# Patient Record
Sex: FEMALE | Race: WHITE | Employment: FULL TIME | URBAN - METROPOLITAN AREA
[De-identification: names, ages, dates, MRNs, and addresses within clinical notes are randomized per-mention and may not be internally consistent; named-entity substitution may affect disease eponyms.]

---

## 2020-07-10 LAB
ABO + RH BLD: NORMAL
ABO + RH BLD: NORMAL
BLD GP AB SCN SERPL QL: NEGATIVE
RUBELLA ABY IGG: NORMAL
TREPONEMA ANTIBODIES: NORMAL

## 2020-12-28 LAB — GROUP B STREP PCR: NEGATIVE

## 2021-01-05 DIAGNOSIS — Z11.59 ENCOUNTER FOR SCREENING FOR OTHER VIRAL DISEASES: Primary | ICD-10-CM

## 2021-01-16 ENCOUNTER — HOSPITAL ENCOUNTER (OUTPATIENT)
Dept: LAB | Facility: CLINIC | Age: 41
Discharge: HOME | End: 2021-01-16
Admitting: STUDENT IN AN ORGANIZED HEALTH CARE EDUCATION/TRAINING PROGRAM
Payer: COMMERCIAL

## 2021-01-16 DIAGNOSIS — Z11.59 ENCOUNTER FOR SCREENING FOR OTHER VIRAL DISEASES: ICD-10-CM

## 2021-01-16 LAB
SARS-COV-2 RNA RESP QL NAA+PROBE: NORMAL
SPECIMEN SOURCE: NORMAL

## 2021-01-16 PROCEDURE — U0005 INFEC AGEN DETEC AMPLI PROBE: HCPCS | Performed by: STUDENT IN AN ORGANIZED HEALTH CARE EDUCATION/TRAINING PROGRAM

## 2021-01-16 PROCEDURE — U0003 INFECTIOUS AGENT DETECTION BY NUCLEIC ACID (DNA OR RNA); SEVERE ACUTE RESPIRATORY SYNDROME CORONAVIRUS 2 (SARS-COV-2) (CORONAVIRUS DISEASE [COVID-19]), AMPLIFIED PROBE TECHNIQUE, MAKING USE OF HIGH THROUGHPUT TECHNOLOGIES AS DESCRIBED BY CMS-2020-01-R: HCPCS | Performed by: STUDENT IN AN ORGANIZED HEALTH CARE EDUCATION/TRAINING PROGRAM

## 2021-01-17 LAB
LABORATORY COMMENT REPORT: NORMAL
SARS-COV-2 RNA RESP QL NAA+PROBE: NEGATIVE
SPECIMEN SOURCE: NORMAL

## 2021-01-19 ENCOUNTER — OFFICE VISIT (OUTPATIENT)
Dept: URGENT CARE | Facility: URGENT CARE | Age: 41
End: 2021-01-19
Payer: COMMERCIAL

## 2021-01-19 DIAGNOSIS — Z20.822 ENCOUNTER FOR LABORATORY TESTING FOR COVID-19 VIRUS: Primary | ICD-10-CM

## 2021-01-19 DIAGNOSIS — Z11.59 SPECIAL SCREENING EXAMINATION FOR VIRAL DISEASE: Primary | ICD-10-CM

## 2021-01-19 DIAGNOSIS — Z11.59 SPECIAL SCREENING EXAMINATION FOR VIRAL DISEASE: ICD-10-CM

## 2021-01-19 LAB
SARS-COV-2 RNA RESP QL NAA+PROBE: NORMAL
SPECIMEN SOURCE: NORMAL

## 2021-01-19 PROCEDURE — 87635 SARS-COV-2 COVID-19 AMP PRB: CPT | Performed by: STUDENT IN AN ORGANIZED HEALTH CARE EDUCATION/TRAINING PROGRAM

## 2021-01-19 NOTE — H&P
Emilie Sandra [facility]  [id number]      OB Admit History & Physical      Pt is a 39 yo  at 39w1d by LMP c/w 1TUS is here for primary  section due to breech presentation. She denies LOF, VB, or contractions. Reports + FM.     Prenatal care: Rolando Harris   Pregnancy complications:   1. AMA, normal cfDNA  2. Level II US with echogenic focus, s/p MFM consult   3. LGA, 36 wk US 84% previously 97%   4. Obesity   5. H/o of HSV on suppression     PNL: O+/Ab-/RI/RPR NR/HBS-/HIV-/GCCT-/GBBS -     Her OB history:      2x EAB s/p 2x D&C  2x MAB     PMHx:   HSV positive, anxiety, anemia, obesity     PMHx:  D&C x 2       Allergies:  NKDA    REVIEW OF SYSTEMS:  NEUROLOGIC:  Negative  EYES:  Negative  ENT:  Negative  GI:  Negative  BREAST:  Negative  :  Negative  GYN:  Negative  CV:  Negative  PULMONARY:  Negative  MUSCULOSKELETAL:  Negative  PSYCH:  Negative        Social History     Socioeconomic History     Marital status:      Spouse name: Not on file     Number of children: Not on file     Years of education: Not on file     Highest education level: Not on file   Occupational History     Not on file   Social Needs     Financial resource strain: Not on file     Food insecurity     Worry: Not on file     Inability: Not on file     Transportation needs     Medical: Not on file     Non-medical: Not on file   Tobacco Use     Smoking status: Not on file   Substance and Sexual Activity     Alcohol use: Not on file     Drug use: Not on file     Sexual activity: Not on file   Lifestyle     Physical activity     Days per week: Not on file     Minutes per session: Not on file     Stress: Not on file   Relationships     Social connections     Talks on phone: Not on file     Gets together: Not on file     Attends Sabianism service: Not on file     Active member of club or organization: Not on file     Attends meetings of clubs or organizations: Not on file     Relationship status: Not on file      Intimate partner violence     Fear of current or ex partner: Not on file     Emotionally abused: Not on file     Physically abused: Not on file     Forced sexual activity: Not on file   Other Topics Concern     Not on file   Social History Narrative     Not on file      No family history on file.    Alert Awake in NAD  HEENT grossly normal  Lungs CTAB  Heart RRR  ABD gravid  EXT:  1+ edema or calf tenderness    Assessment: Pt is a  at 39w1d who presents for  section due to breech presentation.     Plan:   section     - Due to malpresentation and patient declined ECV will proceed with CS. The risks of  section have been reviewed including infection, bleeding and damage to a nearby organ such as the bladder or intestines.  These are rare events.  Antibiotitcs are given to decrease risk of infection. There is an increased risk of adhesions which can increase the risk for complications with repeated  section.  There is also an increased risk of placenta previa and accreta with increasing number of 's.  Medical risks such as VTE were also reviewed.  All her questions were answered and she wishes to proceed.   - Antibiotics, ancef 2 grams   - VTE prophylaxis, SCDs  - Labs, CBC, T&S, and RPR to be drawn       Anayeli Alonzo MD MD  Dept of OB/GYN  2021

## 2021-01-21 ENCOUNTER — ANESTHESIA EVENT (OUTPATIENT)
Dept: OBGYN | Facility: CLINIC | Age: 41
End: 2021-01-21
Payer: COMMERCIAL

## 2021-01-21 ENCOUNTER — HOSPITAL ENCOUNTER (INPATIENT)
Facility: CLINIC | Age: 41
LOS: 3 days | Discharge: HOME | End: 2021-01-24
Attending: STUDENT IN AN ORGANIZED HEALTH CARE EDUCATION/TRAINING PROGRAM | Admitting: STUDENT IN AN ORGANIZED HEALTH CARE EDUCATION/TRAINING PROGRAM
Payer: COMMERCIAL

## 2021-01-21 ENCOUNTER — ANESTHESIA (OUTPATIENT)
Dept: OBGYN | Facility: CLINIC | Age: 41
End: 2021-01-21
Payer: COMMERCIAL

## 2021-01-21 DIAGNOSIS — Z98.891 H/O CESAREAN SECTION: Primary | ICD-10-CM

## 2021-01-21 LAB
ABO + RH BLD: NORMAL
ABO + RH BLD: NORMAL
BLD GP AB SCN SERPL QL: NORMAL
BLOOD BANK CMNT PATIENT-IMP: NORMAL
HBV SURFACE AG SERPL QL IA: NONREACTIVE
HGB BLD-MCNC: 11.1 G/DL (ref 11.7–15.7)
PLATELET # BLD AUTO: 159 10E9/L (ref 150–450)
SPECIMEN EXP DATE BLD: NORMAL
T PALLIDUM AB SER QL: NONREACTIVE

## 2021-01-21 PROCEDURE — 370N000017 HC ANESTHESIA TECHNICAL FEE, PER MIN: Performed by: STUDENT IN AN ORGANIZED HEALTH CARE EDUCATION/TRAINING PROGRAM

## 2021-01-21 PROCEDURE — 258N000003 HC RX IP 258 OP 636: Performed by: NURSE ANESTHETIST, CERTIFIED REGISTERED

## 2021-01-21 PROCEDURE — 360N000076 HC SURGERY LEVEL 3, PER MIN: Performed by: STUDENT IN AN ORGANIZED HEALTH CARE EDUCATION/TRAINING PROGRAM

## 2021-01-21 PROCEDURE — 86850 RBC ANTIBODY SCREEN: CPT | Performed by: STUDENT IN AN ORGANIZED HEALTH CARE EDUCATION/TRAINING PROGRAM

## 2021-01-21 PROCEDURE — 250N000011 HC RX IP 250 OP 636: Performed by: STUDENT IN AN ORGANIZED HEALTH CARE EDUCATION/TRAINING PROGRAM

## 2021-01-21 PROCEDURE — 86780 TREPONEMA PALLIDUM: CPT | Performed by: STUDENT IN AN ORGANIZED HEALTH CARE EDUCATION/TRAINING PROGRAM

## 2021-01-21 PROCEDURE — 120N000012 HC R&B POSTPARTUM

## 2021-01-21 PROCEDURE — 87340 HEPATITIS B SURFACE AG IA: CPT | Performed by: STUDENT IN AN ORGANIZED HEALTH CARE EDUCATION/TRAINING PROGRAM

## 2021-01-21 PROCEDURE — 250N000013 HC RX MED GY IP 250 OP 250 PS 637: Performed by: STUDENT IN AN ORGANIZED HEALTH CARE EDUCATION/TRAINING PROGRAM

## 2021-01-21 PROCEDURE — 272N000001 HC OR GENERAL SUPPLY STERILE: Performed by: STUDENT IN AN ORGANIZED HEALTH CARE EDUCATION/TRAINING PROGRAM

## 2021-01-21 PROCEDURE — 85018 HEMOGLOBIN: CPT | Performed by: STUDENT IN AN ORGANIZED HEALTH CARE EDUCATION/TRAINING PROGRAM

## 2021-01-21 PROCEDURE — 86900 BLOOD TYPING SEROLOGIC ABO: CPT | Performed by: STUDENT IN AN ORGANIZED HEALTH CARE EDUCATION/TRAINING PROGRAM

## 2021-01-21 PROCEDURE — 250N000009 HC RX 250: Performed by: NURSE ANESTHETIST, CERTIFIED REGISTERED

## 2021-01-21 PROCEDURE — 710N000010 HC RECOVERY PHASE 1, LEVEL 2, PER MIN: Performed by: STUDENT IN AN ORGANIZED HEALTH CARE EDUCATION/TRAINING PROGRAM

## 2021-01-21 PROCEDURE — 250N000011 HC RX IP 250 OP 636: Performed by: NURSE ANESTHETIST, CERTIFIED REGISTERED

## 2021-01-21 PROCEDURE — 36415 COLL VENOUS BLD VENIPUNCTURE: CPT | Performed by: STUDENT IN AN ORGANIZED HEALTH CARE EDUCATION/TRAINING PROGRAM

## 2021-01-21 PROCEDURE — 250N000013 HC RX MED GY IP 250 OP 250 PS 637

## 2021-01-21 PROCEDURE — 86901 BLOOD TYPING SEROLOGIC RH(D): CPT | Performed by: STUDENT IN AN ORGANIZED HEALTH CARE EDUCATION/TRAINING PROGRAM

## 2021-01-21 PROCEDURE — 250N000009 HC RX 250: Performed by: STUDENT IN AN ORGANIZED HEALTH CARE EDUCATION/TRAINING PROGRAM

## 2021-01-21 PROCEDURE — 85049 AUTOMATED PLATELET COUNT: CPT | Performed by: STUDENT IN AN ORGANIZED HEALTH CARE EDUCATION/TRAINING PROGRAM

## 2021-01-21 PROCEDURE — 258N000003 HC RX IP 258 OP 636: Performed by: STUDENT IN AN ORGANIZED HEALTH CARE EDUCATION/TRAINING PROGRAM

## 2021-01-21 RX ORDER — MODIFIED LANOLIN
OINTMENT (GRAM) TOPICAL
Status: DISCONTINUED | OUTPATIENT
Start: 2021-01-21 | End: 2021-01-24 | Stop reason: HOSPADM

## 2021-01-21 RX ORDER — TRANEXAMIC ACID 10 MG/ML
1 INJECTION, SOLUTION INTRAVENOUS EVERY 30 MIN PRN
Status: DISCONTINUED | OUTPATIENT
Start: 2021-01-21 | End: 2021-01-24 | Stop reason: HOSPADM

## 2021-01-21 RX ORDER — OXYTOCIN/0.9 % SODIUM CHLORIDE 30/500 ML
340 PLASTIC BAG, INJECTION (ML) INTRAVENOUS CONTINUOUS PRN
Status: DISCONTINUED | OUTPATIENT
Start: 2021-01-21 | End: 2021-01-24 | Stop reason: HOSPADM

## 2021-01-21 RX ORDER — FERROUS SULFATE 325(65) MG
325 TABLET ORAL
COMMUNITY

## 2021-01-21 RX ORDER — OXYCODONE HYDROCHLORIDE 5 MG/1
5 TABLET ORAL EVERY 4 HOURS PRN
Status: DISCONTINUED | OUTPATIENT
Start: 2021-01-21 | End: 2021-01-24 | Stop reason: HOSPADM

## 2021-01-21 RX ORDER — KETOROLAC TROMETHAMINE 30 MG/ML
INJECTION, SOLUTION INTRAMUSCULAR; INTRAVENOUS
Status: DISCONTINUED
Start: 2021-01-21 | End: 2021-01-21 | Stop reason: HOSPADM

## 2021-01-21 RX ORDER — EPHEDRINE SULFATE 50 MG/ML
5 INJECTION, SOLUTION INTRAMUSCULAR; INTRAVENOUS; SUBCUTANEOUS
Status: DISCONTINUED | OUTPATIENT
Start: 2021-01-21 | End: 2021-01-24 | Stop reason: HOSPADM

## 2021-01-21 RX ORDER — BUPIVACAINE HYDROCHLORIDE 7.5 MG/ML
INJECTION, SOLUTION INTRASPINAL PRN
Status: DISCONTINUED | OUTPATIENT
Start: 2021-01-21 | End: 2021-01-21

## 2021-01-21 RX ORDER — ONDANSETRON 2 MG/ML
INJECTION INTRAMUSCULAR; INTRAVENOUS
Status: DISCONTINUED
Start: 2021-01-21 | End: 2021-01-21 | Stop reason: HOSPADM

## 2021-01-21 RX ORDER — NALOXONE HYDROCHLORIDE 0.4 MG/ML
0.4 INJECTION, SOLUTION INTRAMUSCULAR; INTRAVENOUS; SUBCUTANEOUS
Status: DISCONTINUED | OUTPATIENT
Start: 2021-01-21 | End: 2021-01-24 | Stop reason: HOSPADM

## 2021-01-21 RX ORDER — CITRIC ACID/SODIUM CITRATE 334-500MG
30 SOLUTION, ORAL ORAL
Status: COMPLETED | OUTPATIENT
Start: 2021-01-21 | End: 2021-01-21

## 2021-01-21 RX ORDER — KETOROLAC TROMETHAMINE 30 MG/ML
30 INJECTION, SOLUTION INTRAMUSCULAR; INTRAVENOUS EVERY 6 HOURS
Status: DISCONTINUED | OUTPATIENT
Start: 2021-01-21 | End: 2021-01-21

## 2021-01-21 RX ORDER — PRENATAL VIT/IRON FUM/FOLIC AC 27MG-0.8MG
1 TABLET ORAL DAILY
COMMUNITY

## 2021-01-21 RX ORDER — HYDROCORTISONE 2.5 %
CREAM (GRAM) TOPICAL 3 TIMES DAILY PRN
Status: DISCONTINUED | OUTPATIENT
Start: 2021-01-21 | End: 2021-01-24 | Stop reason: HOSPADM

## 2021-01-21 RX ORDER — NALOXONE HYDROCHLORIDE 0.4 MG/ML
0.2 INJECTION, SOLUTION INTRAMUSCULAR; INTRAVENOUS; SUBCUTANEOUS
Status: DISCONTINUED | OUTPATIENT
Start: 2021-01-21 | End: 2021-01-24 | Stop reason: HOSPADM

## 2021-01-21 RX ORDER — LIDOCAINE 40 MG/G
CREAM TOPICAL
Status: DISCONTINUED | OUTPATIENT
Start: 2021-01-21 | End: 2021-01-24 | Stop reason: HOSPADM

## 2021-01-21 RX ORDER — ACETAMINOPHEN 325 MG/1
975 TABLET ORAL EVERY 6 HOURS
Status: DISCONTINUED | OUTPATIENT
Start: 2021-01-21 | End: 2021-01-24 | Stop reason: HOSPADM

## 2021-01-21 RX ORDER — NALBUPHINE HYDROCHLORIDE 10 MG/ML
2.5-5 INJECTION, SOLUTION INTRAMUSCULAR; INTRAVENOUS; SUBCUTANEOUS EVERY 6 HOURS PRN
Status: DISCONTINUED | OUTPATIENT
Start: 2021-01-21 | End: 2021-01-24 | Stop reason: HOSPADM

## 2021-01-21 RX ORDER — BISACODYL 10 MG
10 SUPPOSITORY, RECTAL RECTAL DAILY PRN
Status: DISCONTINUED | OUTPATIENT
Start: 2021-01-23 | End: 2021-01-24 | Stop reason: HOSPADM

## 2021-01-21 RX ORDER — SIMETHICONE 80 MG
80 TABLET,CHEWABLE ORAL 4 TIMES DAILY PRN
Status: DISCONTINUED | OUTPATIENT
Start: 2021-01-21 | End: 2021-01-24 | Stop reason: HOSPADM

## 2021-01-21 RX ORDER — KETOROLAC TROMETHAMINE 30 MG/ML
30 INJECTION, SOLUTION INTRAMUSCULAR; INTRAVENOUS EVERY 6 HOURS
Status: COMPLETED | OUTPATIENT
Start: 2021-01-21 | End: 2021-01-21

## 2021-01-21 RX ORDER — CITRIC ACID/SODIUM CITRATE 334-500MG
SOLUTION, ORAL ORAL
Status: COMPLETED
Start: 2021-01-21 | End: 2021-01-21

## 2021-01-21 RX ORDER — VALACYCLOVIR HYDROCHLORIDE 500 MG/1
500 TABLET, FILM COATED ORAL 2 TIMES DAILY
Status: ON HOLD | COMMUNITY
End: 2021-01-24

## 2021-01-21 RX ORDER — AMOXICILLIN 250 MG
2 CAPSULE ORAL 2 TIMES DAILY
Status: DISCONTINUED | OUTPATIENT
Start: 2021-01-21 | End: 2021-01-24 | Stop reason: HOSPADM

## 2021-01-21 RX ORDER — DEXTROSE, SODIUM CHLORIDE, SODIUM LACTATE, POTASSIUM CHLORIDE, AND CALCIUM CHLORIDE 5; .6; .31; .03; .02 G/100ML; G/100ML; G/100ML; G/100ML; G/100ML
INJECTION, SOLUTION INTRAVENOUS CONTINUOUS
Status: DISCONTINUED | OUTPATIENT
Start: 2021-01-21 | End: 2021-01-24 | Stop reason: HOSPADM

## 2021-01-21 RX ORDER — ACETAMINOPHEN 325 MG/1
TABLET ORAL
Status: DISCONTINUED
Start: 2021-01-21 | End: 2021-01-21 | Stop reason: HOSPADM

## 2021-01-21 RX ORDER — MORPHINE SULFATE 1 MG/ML
150 INJECTION, SOLUTION EPIDURAL; INTRATHECAL; INTRAVENOUS ONCE
Status: DISCONTINUED | OUTPATIENT
Start: 2021-01-21 | End: 2021-01-22

## 2021-01-21 RX ORDER — ONDANSETRON 2 MG/ML
4 INJECTION INTRAMUSCULAR; INTRAVENOUS EVERY 6 HOURS PRN
Status: DISCONTINUED | OUTPATIENT
Start: 2021-01-21 | End: 2021-01-24 | Stop reason: HOSPADM

## 2021-01-21 RX ORDER — CEFAZOLIN SODIUM 1 G/3ML
1 INJECTION, POWDER, FOR SOLUTION INTRAMUSCULAR; INTRAVENOUS SEE ADMIN INSTRUCTIONS
Status: DISCONTINUED | OUTPATIENT
Start: 2021-01-21 | End: 2021-01-21

## 2021-01-21 RX ORDER — AMOXICILLIN 250 MG
1 CAPSULE ORAL 2 TIMES DAILY
Status: DISCONTINUED | OUTPATIENT
Start: 2021-01-21 | End: 2021-01-24 | Stop reason: HOSPADM

## 2021-01-21 RX ORDER — IBUPROFEN 600 MG/1
600 TABLET, FILM COATED ORAL EVERY 6 HOURS
Status: DISCONTINUED | OUTPATIENT
Start: 2021-01-22 | End: 2021-01-24 | Stop reason: HOSPADM

## 2021-01-21 RX ORDER — OXYTOCIN/0.9 % SODIUM CHLORIDE 30/500 ML
PLASTIC BAG, INJECTION (ML) INTRAVENOUS CONTINUOUS PRN
Status: DISCONTINUED | OUTPATIENT
Start: 2021-01-21 | End: 2021-01-21

## 2021-01-21 RX ORDER — IBUPROFEN 400 MG/1
800 TABLET, FILM COATED ORAL EVERY 6 HOURS
Status: DISCONTINUED | OUTPATIENT
Start: 2021-01-22 | End: 2021-01-21

## 2021-01-21 RX ORDER — CEFAZOLIN SODIUM 2 G/100ML
INJECTION, SOLUTION INTRAVENOUS
Status: DISCONTINUED
Start: 2021-01-21 | End: 2021-01-21 | Stop reason: HOSPADM

## 2021-01-21 RX ORDER — MORPHINE SULFATE 1 MG/ML
INJECTION, SOLUTION EPIDURAL; INTRATHECAL; INTRAVENOUS PRN
Status: DISCONTINUED | OUTPATIENT
Start: 2021-01-21 | End: 2021-01-21

## 2021-01-21 RX ORDER — DOCUSATE SODIUM 100 MG/1
100 CAPSULE, LIQUID FILLED ORAL 2 TIMES DAILY
COMMUNITY

## 2021-01-21 RX ORDER — OXYTOCIN 10 [USP'U]/ML
10 INJECTION, SOLUTION INTRAMUSCULAR; INTRAVENOUS
Status: DISCONTINUED | OUTPATIENT
Start: 2021-01-21 | End: 2021-01-24 | Stop reason: HOSPADM

## 2021-01-21 RX ORDER — OXYTOCIN/0.9 % SODIUM CHLORIDE 30/500 ML
100 PLASTIC BAG, INJECTION (ML) INTRAVENOUS CONTINUOUS
Status: DISCONTINUED | OUTPATIENT
Start: 2021-01-21 | End: 2021-01-24 | Stop reason: HOSPADM

## 2021-01-21 RX ORDER — CEFAZOLIN SODIUM 2 G/100ML
2 INJECTION, SOLUTION INTRAVENOUS
Status: COMPLETED | OUTPATIENT
Start: 2021-01-21 | End: 2021-01-21

## 2021-01-21 RX ORDER — SODIUM CHLORIDE, SODIUM LACTATE, POTASSIUM CHLORIDE, CALCIUM CHLORIDE 600; 310; 30; 20 MG/100ML; MG/100ML; MG/100ML; MG/100ML
INJECTION, SOLUTION INTRAVENOUS CONTINUOUS
Status: DISCONTINUED | OUTPATIENT
Start: 2021-01-21 | End: 2021-01-21

## 2021-01-21 RX ADMIN — Medication 100 ML/HR: at 11:45

## 2021-01-21 RX ADMIN — SODIUM CHLORIDE, POTASSIUM CHLORIDE, SODIUM LACTATE AND CALCIUM CHLORIDE: 600; 310; 30; 20 INJECTION, SOLUTION INTRAVENOUS at 07:55

## 2021-01-21 RX ADMIN — Medication 30 ML: at 06:50

## 2021-01-21 RX ADMIN — KETOROLAC TROMETHAMINE 30 MG: 30 INJECTION, SOLUTION INTRAMUSCULAR at 15:51

## 2021-01-21 RX ADMIN — ONDANSETRON 4 MG: 2 INJECTION INTRAMUSCULAR; INTRAVENOUS at 09:54

## 2021-01-21 RX ADMIN — KETOROLAC TROMETHAMINE 30 MG: 30 INJECTION, SOLUTION INTRAMUSCULAR at 09:06

## 2021-01-21 RX ADMIN — SODIUM CITRATE AND CITRIC ACID MONOHYDRATE 30 ML: 500; 334 SOLUTION ORAL at 06:50

## 2021-01-21 RX ADMIN — PHENYLEPHRINE HYDROCHLORIDE 0.5 MCG/KG/MIN: 10 INJECTION INTRAVENOUS at 07:08

## 2021-01-21 RX ADMIN — BUPIVACAINE HYDROCHLORIDE IN DEXTROSE 9 MG: 7.5 INJECTION, SOLUTION SUBARACHNOID at 07:10

## 2021-01-21 RX ADMIN — DOCUSATE SODIUM 50 MG AND SENNOSIDES 8.6 MG 1 TABLET: 8.6; 5 TABLET, FILM COATED ORAL at 20:19

## 2021-01-21 RX ADMIN — Medication 340 ML/HR: at 07:35

## 2021-01-21 RX ADMIN — SODIUM CHLORIDE, POTASSIUM CHLORIDE, SODIUM LACTATE AND CALCIUM CHLORIDE: 600; 310; 30; 20 INJECTION, SOLUTION INTRAVENOUS at 05:30

## 2021-01-21 RX ADMIN — ACETAMINOPHEN 975 MG: 325 TABLET, FILM COATED ORAL at 09:01

## 2021-01-21 RX ADMIN — ACETAMINOPHEN 975 MG: 325 TABLET, FILM COATED ORAL at 15:51

## 2021-01-21 RX ADMIN — KETOROLAC TROMETHAMINE 30 MG: 30 INJECTION, SOLUTION INTRAMUSCULAR at 22:08

## 2021-01-21 RX ADMIN — ACETAMINOPHEN 975 MG: 325 TABLET, FILM COATED ORAL at 22:08

## 2021-01-21 RX ADMIN — MORPHINE SULFATE 0.15 MG: 1 INJECTION, SOLUTION EPIDURAL; INTRATHECAL; INTRAVENOUS at 07:10

## 2021-01-21 RX ADMIN — CEFAZOLIN SODIUM 2 G: 2 INJECTION, SOLUTION INTRAVENOUS at 07:10

## 2021-01-21 RX ADMIN — SODIUM CHLORIDE, SODIUM LACTATE, POTASSIUM CHLORIDE, CALCIUM CHLORIDE AND DEXTROSE MONOHYDRATE: 5; 600; 310; 30; 20 INJECTION, SOLUTION INTRAVENOUS at 17:00

## 2021-01-21 ASSESSMENT — MIFFLIN-ST. JEOR: SCORE: 1549.25

## 2021-01-21 NOTE — ANESTHESIA PREPROCEDURE EVALUATION
Anesthesia Pre-Procedure Evaluation    Patient: Emilie Browning   MRN: [id number] : 1980        Preoperative Diagnosis: Spontaneous breech delivery, single or unspecified fetus [O32.1XX0]   Procedure : Procedure(s):   SECTION     Past Medical History:   Diagnosis Date     Anemia      Anxiety      Genital herpes       Past Surgical History:   Procedure Laterality Date     GYN SURGERY      D&C x2     GYN SURGERY      ovarian cyst removal      No Known Allergies   Social History     Tobacco Use     Smoking status: Never Smoker     Smokeless tobacco: Never Used   Substance Use Topics     Alcohol use: Not Currently      Wt Readings from Last 1 Encounters:   21 90.7 kg (200 lb)        Anesthesia Evaluation            ROS/MED HX  ENT/Pulmonary:    (-) sleep apnea   Neurologic:       Cardiovascular:       METS/Exercise Tolerance:     Hematologic:     (+) anemia,     Musculoskeletal:       GI/Hepatic:     (+) GERD,     Renal/Genitourinary:       Endo:       Psychiatric/Substance Use:     (+) psychiatric history anxiety     Infectious Disease:       Malignancy:       Other:      (+) Possibly pregnant, ,         Physical Exam    Airway        Mallampati: II   TM distance: > 3 FB   Neck ROM: full   Mouth opening: > 3 cm    Respiratory Devices and Support         Dental  no notable dental history         Cardiovascular   cardiovascular exam normal          Pulmonary   pulmonary exam normal                OUTSIDE LABS:  CBC:   Lab Results   Component Value Date    HGB 11.1 (L) 2021     2021     BMP: No results found for: NA, POTASSIUM, CHLORIDE, CO2, BUN, CR, GLC  COAGS: No results found for: PTT, INR, FIBR  POC: No results found for: BGM, HCG, HCGS  HEPATIC: No results found for: ALBUMIN, PROTTOTAL, ALT, AST, GGT, ALKPHOS, BILITOTAL, BILIDIRECT, WINNIE  OTHER: No results found for: PH, LACT, A1C, RICHIE, PHOS, MAG, LIPASE, AMYLASE, TSH, T4, T3, CRP, SED    Anesthesia Plan     History &  Physical Review      ASA Status:  2. NPO Status:  NPO Appropriate. .  Plan for Spinal         PONV prophylaxis:  Ondansetron (or other 5HT-3).       Consents  Anesthesia Plan(s) and associated risks, benefits, and realistic alternatives discussed.    Questions answered and patient/representative(s) expressed understanding.    Discussed with:  Patient.             Postoperative Care            KELSEY MUNIZ MD

## 2021-01-21 NOTE — ANESTHESIA POSTPROCEDURE EVALUATION
Patient: Emilie Browning    Procedure(s):   SECTION    Diagnosis:Spontaneous breech delivery, single or unspecified fetus [O32.1XX0]  Diagnosis Additional Information: No value filed.    Anesthesia Type:  Spinal    Note:  Disposition: Admission   Postop Pain Control: Uneventful            Sign Out: Well controlled pain   PONV: No   Neuro/Psych: Uneventful            Sign Out: Acceptable/Baseline neuro status   Airway/Respiratory: Uneventful            Sign Out: Acceptable/Baseline resp. status   CV/Hemodynamics: Uneventful            Sign Out: Acceptable CV status   Other NRE: NONE   DID A NON-ROUTINE EVENT OCCUR? No         Last vitals:  Vitals:    21 1230 21 1330 21 1430   BP: 107/59 111/57 111/63   Resp: 16 16 16   Temp:   36.4  C (97.6  F)   SpO2: 99% 98% 97%       Electronically Signed By: KELSEY MUNIZ MD  2021  3:04 PM

## 2021-01-21 NOTE — ANESTHESIA PROCEDURE NOTES
Pre-Procedure   Staff -   Anesthesiologist:  Stanislaw Carmona MD  Performed By: anesthesiologist  Location: OR  Pre-Anesthestic Checklist: patient identified, IV checked, risks and benefits discussed, informed consent, monitors and equipment checked and pre-op evaluation  Timeout:  Correct Patient: Yes   Correct Procedure: Yes   Correct Site: Yes   Correct Position: Yes   Correct Laterality: N/A   Site Marked: N/A    Procedure Documentation  Procedure: intrathecal  Patient Position:sitting  Patient Prep/Sterile Barriers: Betadine  Insertion Site: L4-5. (midline approach).  Spinal Needle (gauge): 20   Spinal Needle Type: Schuyler tipIntroducer used  Introducer: 20 G  # of attempts: 1 and # of redirects:     Assessment/Narrative      Paresthesias: No.  CSF fluid: clear.  Comments:  1% lidocaine for localization.  No complications.

## 2021-01-21 NOTE — PLAN OF CARE
[place] [facility] [place]  due to [place]ch presentation accompanied by spouse.  Pt denies any complications with this pregnancy.  Admission assessment completed.

## 2021-01-21 NOTE — PROCEDURES
OB  Delivery Note      Emilie Browning MRN# [id number]   Age: 40 year old YOB: 1980       GA: 39w1d  GP:   Labor Complications: None   Delivery QBL:  815 mL  Delivery Type: , Low Transverse        1 Minute 5 Minute 10 Minute   Apgar Totals: 9   9        Personel Present:  Dr. Moon Alonzo and Dr. Jain     Details    Pre-Op Diagnosis: 1. Intrauterine pregnancy at 39w1d  2. Breech presentation   3. AMA   4. Obesity   5. LGA   Post-Op Diagnosis: 1. Intrauterine pregnancy at 39w1d now delivered   2. Breech presentation   3. AMA   4. Obesity    Indications:   Breech presentation    Procedure:   , Low Transverse  via   incision   Anesthesia:  Epidural      Informed Consent:  Pt is a  at 39w1d who presents for primary  section due to breech presentation. The risks, benefits, complications, and alternatives were discussed with the patient. The patient understood that the risks of  section include, but are not limited to: injury to nearby structures or organs, infection, blood loss and possible need for transfusion, and potential need for more surgery including hysterectomy. The patient stated understanding and desired to proceed. All questions were answered. The site of surgery was properly noted and marked. The patient was identified as Emilie Browning and the procedure verified as a  delivery. A Time Out was held and the above information confirmed.    Procedure Details:  The patient was taken to the operating room where   anesthesia was found to be adequate. Antibiotics (2 grams ancef) were given for infection prophylaxis. She was prepped and draped in the normal sterile fashion in the dorsal supine position with leftward tilt. A Pfannenstiel skin incision was made with scalpel. The incision was carried down to the fascia, bovie was used as needed for hemostasis. The fascia was incised and extended laterally with Callahan  scissors. The inferior aspect of the fascia was grasped with Kocher clamps. The underlying rectus and pyramidalis muscles were dissected off with Callahan scissors. In a similar fashion, the superior aspect of the fascia was elevated with Kocher clamps, and the rectus muscle was dissected off. The rectus muscles were  in the midline. The peritoneum was identified and entered bluntly. Peritoneal incision was extended superiorly and inferiorly with Metzenbaum  with good visualization of the bladder.    The bladder blade was inserted, vesicouterine peritoneum was identified, and bladder flap created sharply. The lower uterine segment was then incised with a   incision and was extended bluntly. The amniotic sac was ruptured and   color noted. The bladder blade was removed and the infant was delivered atraumatically using the usual maneuvers. The remainder of the infant was delivered, the cord clamped and cut, and the baby was handed off to the awaiting clinicians.     The placenta was removed via manual massage. The hysterotomy was repaired with suture of 0 Vicryl in a running locked fashion. At the left edge of the hysterotomy noted branch of uterine was bleeding and special attention was addressed at this area to ensure hemostasis. A second imbricating layer of the same suture was placed and good hemostasis observed. The ovaries and tubes appeared normal bilaterally.The uterine incision was reinspected and found to be hemostatic. A moist lap was used bilaterally with suction in both the right and left gutters to clear the area of clot. The subfascial spaces were inspected and noted to be hemostatic. The fascia was then reapproximated with 0 looped PDS. The subcutaneous tissue was closed with 2-0 plain gut. The skin was closed with 3-0 Monocryl on a kamala needle.     The patient tolerated the procedure well. Sponge, instrument, and needle counts were correct and the patient was taken to the recovery room in good  "and stable condition.       Lynne Browning [[id number]]    Delivery/Placenta Date and Time    Delivery Date: 21 Delivery Time:  7:33 AM      Apgars    Living status: Living   1 Minute 5 Minute 10 Minute 15 Minute 20 Minute   Skin color: 1  1       Heart rate: 2  2       Reflex irritability: 2  2       Muscle tone: 2  2       Respiratory effort: 2  2       Total: 9  9       Apgars assigned by: ROXY ELMORE RN     Cord    Vessels: 3 Vessels Complications: None   Cord Blood Disposition: Lab Gases Sent?: No      [place] Resuscitation    Methods: None  Output in Delivery Room: Voided     [place] Measurements    Weight: 9 lb 1 oz Length: 1' 8.25\"   Head circumference: 37.5 cm       Skin to Skin and Feeding Plan    Skin to skin initiation date/time: 1841    Skin to skin with: Mother  Skin to skin end date/time:     How do you plan to feed your baby: Breastfeeding     Labor Events and Shoulder Dystocia    Fetal Tracing Prior to Delivery: Category 1     Delivery (Maternal) (Provider to Complete) ([id number])    Episiotomy: None  Perineal lacerations: None       Blood Loss  Mother: Emilie Browning #[id number]   Start of Mother's Information    IO Blood Loss  21 0723 - 21 0820    Total Surgical QBL Blood Loss (mL) Hospital Encounter 815 mL    Total  815 mL         End of Mother's Information  Mother: Emilie Browning #[id number]          Delivery - Provider to Complete ([id number])    Delivering clinician: Anayeli Alonzo MD  Delivery Type (Choose the 1 that will go to the Birth History): , Low Transverse                    Specifics: Primary nulliparius   Indications for Primary: Other   Other Indications: breech presentation                  Placenta    Delayed Cord Clamping: Done  Removal: Spontaneous  Disposition: Hospital disposal           Presentation and Position    Presentation: Breech                  Anayeli Alonzo MD  "

## 2021-01-21 NOTE — PLAN OF CARE
1030-Patient transferred to room 433 with infant in arms-sr up x2- FOB at bedside-Report given to Veronica LÓPEZ

## 2021-01-21 NOTE — LACTATION NOTE
Initial Lactation visit with Emilie, significant other Nelson & baby boy Chet. Baby showing feeding cues as LC entered room. Emilie shared she's feeling nauseous off and on, so requested to keep head of bed lower. LC assisted to bring baby skin to skin to her right breast in football hold. When baby got to breast, he had a small clear spit-up, and no longer showed any feeding cues. Encouraged Emilie to keep him skin to skin. Using a nipple shield with feedings, started right after delivery by primary RNs for smooth nipples and difficulty with latch-on. Encouraged frequent feedings and skin to skin.    Recommend unlimited, frequent breast feedings: At least 8 - 12 times every 24 hours. Recommended rooming in. Instructed in hand expression. Avoid pacifiers and supplementation with formula unless medically indicated. Explained benefits of holding baby skin on skin to help promote better breastfeeding outcomes. Emilie appreciative of visit. Requests another Lactation check-in when she's feeling better. Will revisit as needed.    Afshan Fishman, RN-C, IBCLC, MNN, PHN, BSN

## 2021-01-21 NOTE — ANESTHESIA CARE TRANSFER NOTE
Patient: Emilie rBowning    Procedure(s):   SECTION    Diagnosis: Spontaneous breech delivery, single or unspecified fetus [O32.1XX0]  Diagnosis Additional Information: No value filed.    Anesthesia Type:   Spinal     Note:    Oropharynx: oropharynx clear of all foreign objects and spontaneously breathing  Level of Consciousness: awake  Oxygen Supplementation: room air    Independent Airway: airway patency satisfactory and stable  Dentition: dentition unchanged  Vital Signs Stable: post-procedure vital signs reviewed and stable  Report to RN Given: handoff report given  Patient transferred to: PACU    Handoff Report: Identifed the Patient, Identified the Reponsible Provider, Reviewed the pertinent medical history, Discussed the surgical course, Reviewed Intra-OP anesthesia mangement and issues during anesthesia, Set expectations for post-procedure period and Allowed opportunity for questions and acknowledgement of understanding      Vitals: (Last set prior to Anesthesia Care Transfer)  CRNA VITALS  2021 0800 - 2021 0830      2021             Resp Rate (set):  10        Electronically Signed By: Malika Santillan  2021  8:30 AM

## 2021-01-22 ENCOUNTER — MEDICAL CORRESPONDENCE (OUTPATIENT)
Dept: HEALTH INFORMATION MANAGEMENT | Facility: CLINIC | Age: 41
End: 2021-01-22

## 2021-01-22 LAB — HGB BLD-MCNC: 8.8 G/DL (ref 11.7–15.7)

## 2021-01-22 PROCEDURE — 36415 COLL VENOUS BLD VENIPUNCTURE: CPT | Performed by: STUDENT IN AN ORGANIZED HEALTH CARE EDUCATION/TRAINING PROGRAM

## 2021-01-22 PROCEDURE — 250N000013 HC RX MED GY IP 250 OP 250 PS 637: Performed by: STUDENT IN AN ORGANIZED HEALTH CARE EDUCATION/TRAINING PROGRAM

## 2021-01-22 PROCEDURE — 120N000012 HC R&B POSTPARTUM

## 2021-01-22 PROCEDURE — 85018 HEMOGLOBIN: CPT | Performed by: STUDENT IN AN ORGANIZED HEALTH CARE EDUCATION/TRAINING PROGRAM

## 2021-01-22 RX ADMIN — OXYCODONE HYDROCHLORIDE 5 MG: 5 TABLET ORAL at 13:56

## 2021-01-22 RX ADMIN — OXYCODONE HYDROCHLORIDE 5 MG: 5 TABLET ORAL at 09:05

## 2021-01-22 RX ADMIN — ACETAMINOPHEN 975 MG: 325 TABLET, FILM COATED ORAL at 21:56

## 2021-01-22 RX ADMIN — IBUPROFEN 600 MG: 600 TABLET, FILM COATED ORAL at 04:00

## 2021-01-22 RX ADMIN — IBUPROFEN 600 MG: 600 TABLET, FILM COATED ORAL at 21:56

## 2021-01-22 RX ADMIN — DOCUSATE SODIUM 50 MG AND SENNOSIDES 8.6 MG 1 TABLET: 8.6; 5 TABLET, FILM COATED ORAL at 09:05

## 2021-01-22 RX ADMIN — DOCUSATE SODIUM 50 MG AND SENNOSIDES 8.6 MG 1 TABLET: 8.6; 5 TABLET, FILM COATED ORAL at 23:37

## 2021-01-22 RX ADMIN — IBUPROFEN 600 MG: 600 TABLET, FILM COATED ORAL at 10:07

## 2021-01-22 RX ADMIN — IBUPROFEN 600 MG: 600 TABLET, FILM COATED ORAL at 16:03

## 2021-01-22 RX ADMIN — ACETAMINOPHEN 975 MG: 325 TABLET, FILM COATED ORAL at 16:03

## 2021-01-22 RX ADMIN — OXYCODONE HYDROCHLORIDE 5 MG: 5 TABLET ORAL at 23:36

## 2021-01-22 RX ADMIN — ACETAMINOPHEN 975 MG: 325 TABLET, FILM COATED ORAL at 04:00

## 2021-01-22 RX ADMIN — OXYCODONE HYDROCHLORIDE 5 MG: 5 TABLET ORAL at 19:03

## 2021-01-22 RX ADMIN — ACETAMINOPHEN 975 MG: 325 TABLET, FILM COATED ORAL at 10:07

## 2021-01-22 NOTE — PLAN OF CARE
Vss, fundus firm with scant flow. Dressing intact. Pt w/nausea which has improved through day. Pt able to tolerate good po intake and soup for dinner. Rendon removed, pt is due to void. Pt up 1 assist to bathroom for pericare. Wearing abdominal binder for comfort. Pain managed with scheduled tylenol and toradol. Working on breast feeding , using nipple shield. Spouse at bedside and supportive. Encoaurged to call with questions/needs.

## 2021-01-22 NOTE — LACTATION NOTE
This note was copied from a baby's chart.  Lactation check-in. Per RN, infant has not latched on since birth, but has been getting drops of EBM. Infant sleeping as LC enters room; undressed and brought skin to skin. Infant appears gaggy; after being held up for a few minutes, infant burps. Infant takes in gloved finger and begins suckling; requires some downward pressure on the tongue; begins suckling vigorously. Infant transitioned to breast with nipple shield and suckles briefly before falling asleep. Recommend offering donor milk or formula at breast to encourage more vigorous feeding. Emilie requests to use formula; 5 ml formula introduced at breast using feeding tube device. Infant continued to suckle vigorously for 10-15 minutes at breast, while taking 5ml of formula supplementation.     Emilie has Anastasia pump from home; discuss using the pump for 15 minutes after each feeding session, for increases stimulation. Can offer EBM as supplement and continue with small amount of supplementation at breast if it encourages more interest in breastfeeding. Will continue to monitor as needed.    Irlanda Bartholomew RN, IBCLC

## 2021-01-22 NOTE — PLAN OF CARE
Doing well,vss,voiding with out difficulty,pain control with tylenol,ibuprofen&oxycodone,up independently in room free of dizziness,working on breast feeding,using nipple shield,supplementing formula at breast using tube/syringe,pumping after getting drops of colostrum.Will continue to monitor.

## 2021-01-22 NOTE — PLAN OF CARE
Vital signs stable. Patient voiding without difficulty. Able to ambulate in room free of dizziness. Taking tylenol and ibuprofen for pain management. Working on breastfeeding infant every 2-3 hours with the nipple shield. Pumping as needed. Encouraged to call with questions/concerns. Will continue to monitor.

## 2021-01-22 NOTE — PROGRESS NOTES
"OB Post-op  Section Progress Note POD# 1    S:  Patient doing well.  Pain well controlled with IV and oral pain medication.  Ambulating.  Tolerating clears.  No N/V.  Voiding.  Bleeding is normal.  Breastfeeding.    O:  /58 (BP Location: Left arm)   Pulse 74   Temp 98.1  F (36.7  C) (Oral)   Resp 16   Ht 1.613 m (5' 3.5\")   Wt 90.7 kg (200 lb)   SpO2 98%   Breastfeeding Unknown   BMI 34.87 kg/m    UOP- 24hr 2215, Since     Gen- A&O, NAD  Abd- soft, non tender, non distended  Fundus- firm, non tender  Dressing- C/D/I  Ext- non-tender, mild edema.     Hemoglobin   Date Value Ref Range Status   2021 11.1 (L) 11.7 - 15.7 g/dL Final     O pos, RPR NR, Covid Neg, Rubella immune  Am Hgb pending    A/P:  40 year old  POD# 1 s/p primary LTCS for breech. Doing very well.    1.  Routine post-op cares  2.  Analgesia adequate  3.  Ambulate   4.  Advance to regular diet  5. Remove HL.    Geraldine Gomze MD  2021  8:16 AM   "

## 2021-01-23 PROCEDURE — 250N000013 HC RX MED GY IP 250 OP 250 PS 637: Performed by: SPECIALIST

## 2021-01-23 PROCEDURE — 120N000012 HC R&B POSTPARTUM

## 2021-01-23 PROCEDURE — 250N000013 HC RX MED GY IP 250 OP 250 PS 637: Performed by: STUDENT IN AN ORGANIZED HEALTH CARE EDUCATION/TRAINING PROGRAM

## 2021-01-23 RX ORDER — POLYETHYLENE GLYCOL 3350 17 G/17G
17 POWDER, FOR SOLUTION ORAL DAILY
Status: DISCONTINUED | OUTPATIENT
Start: 2021-01-23 | End: 2021-01-24 | Stop reason: HOSPADM

## 2021-01-23 RX ADMIN — OXYCODONE HYDROCHLORIDE 5 MG: 5 TABLET ORAL at 18:27

## 2021-01-23 RX ADMIN — OXYCODONE HYDROCHLORIDE 5 MG: 5 TABLET ORAL at 07:53

## 2021-01-23 RX ADMIN — IBUPROFEN 600 MG: 600 TABLET, FILM COATED ORAL at 22:06

## 2021-01-23 RX ADMIN — ACETAMINOPHEN 975 MG: 325 TABLET, FILM COATED ORAL at 03:34

## 2021-01-23 RX ADMIN — DOCUSATE SODIUM 50 MG AND SENNOSIDES 8.6 MG 2 TABLET: 8.6; 5 TABLET, FILM COATED ORAL at 22:07

## 2021-01-23 RX ADMIN — ACETAMINOPHEN 975 MG: 325 TABLET, FILM COATED ORAL at 16:04

## 2021-01-23 RX ADMIN — ACETAMINOPHEN 975 MG: 325 TABLET, FILM COATED ORAL at 22:07

## 2021-01-23 RX ADMIN — ACETAMINOPHEN 975 MG: 325 TABLET, FILM COATED ORAL at 10:08

## 2021-01-23 RX ADMIN — IBUPROFEN 600 MG: 600 TABLET, FILM COATED ORAL at 16:04

## 2021-01-23 RX ADMIN — OXYCODONE HYDROCHLORIDE 5 MG: 5 TABLET ORAL at 22:06

## 2021-01-23 RX ADMIN — OXYCODONE HYDROCHLORIDE 5 MG: 5 TABLET ORAL at 03:34

## 2021-01-23 RX ADMIN — IBUPROFEN 600 MG: 600 TABLET, FILM COATED ORAL at 03:34

## 2021-01-23 RX ADMIN — IBUPROFEN 600 MG: 600 TABLET, FILM COATED ORAL at 10:08

## 2021-01-23 RX ADMIN — POLYETHYLENE GLYCOL 3350 17 G: 17 POWDER, FOR SOLUTION ORAL at 13:17

## 2021-01-23 RX ADMIN — OXYCODONE HYDROCHLORIDE 5 MG: 5 TABLET ORAL at 13:18

## 2021-01-23 RX ADMIN — DOCUSATE SODIUM 50 MG AND SENNOSIDES 8.6 MG 2 TABLET: 8.6; 5 TABLET, FILM COATED ORAL at 07:52

## 2021-01-23 NOTE — PLAN OF CARE
Doing well,vss,voiding with out difficulty,pain control with tylenol,ibuprofen&oxycodone,incision intact with steri strips,,baby breast feeding ok using nipple shield,supplementing formula using syringe/tube&pumping after nursing getting drops of colostrum.Will continue to monitor.

## 2021-01-23 NOTE — PROGRESS NOTES
"POD#2    S: Pt doing well. Standing. She is tired nipples sore.Infant is being . Pain is controlled with current analgesics.  Medication(s) being used: acetaminophen, ibuprofen (OTC) and narcotic analgesics including oxycodone no BM yet..    O: /70   Pulse 80   Temp 98  F (36.7  C) (Oral)   Resp 18   Ht 1.613 m (5' 3.5\")   Wt 90.7 kg (200 lb)   SpO2 98%   Breastfeeding Unknown   BMI 34.87 kg/m          ABD:  Uterine fundus is firm, non-tender and at the level of the umbilicus       INC: clean/dry/intact steristrips are horizontal no redness etc.                Hemoglobin   Date Value Ref Range Status   01/22/2021 8.8 (L) 11.7 - 15.7 g/dL Final            Lab Results   Component Value Date    RH Pos 01/21/2021             A: Post-op Day #2 s/p C/Section    P: Miralax pain meds. Concerned about the height of her bed at home Continue Post Op Cares   Plan to discharge tomorrow.    Electronically signed by Olimpia Jain MD 11:00 AM 1/23/2021      "

## 2021-01-23 NOTE — LACTATION NOTE
"This note was copied from a baby's chart.  Routine visit with Emilie, FOB and infant. Infant attempted to feed with RN in room at this time. Infant was frustrated and wouldn't latch even after RN hand expressed Emilie to encourage infant to latch. RN finger fed 1-2mls formula. Able to transition infant to breast on right side with nipple shield and tube at breast. Infant latched right away and was able to draw formula from tube for a total of 10mls. Emilie encouraged by this. Emilie pumping using Anastasia pump. Encouraged to use hospital grade pump while trying to initiate a supply. Educated that Anastasia will be more useful for maintaining a supply. Emilie in agreement to switch to hospital grade pump at this time. LC went over care and use. Hands free bra made. Emilie got 0.5mls with pumping and was encouraged by this. LC swiped drops into infants mouth with clean, gloved finger. Emilie started on hydrogels d/t cracking on nipples bilaterally. Educated on use and to wipe off after use before feeding. Also discussed there possibility of Emilie renting a hospital grade pump for the first month at home if still needing to pump and supplement at discharge.     Breastfeeding general information reviewed.      Breastfeeding going better this feeding per mother. Pt has a pump for use at home. Encouraged to read \"A New Beginning\". Patient thankful for LC support and advice.    All questions answered. No further questions at this time. Will follow as needed.     NADIRA Villalobos RN, BSN, PHN, IBCLC    "

## 2021-01-23 NOTE — LACTATION NOTE
This note was copied from a baby's chart.  Routine visit with Emilie. FOB holding infant and Infant rooting; recommend feeding again. Emilie and  able to bring infant to breast independently; nipples reddened; using nipple shield. Infant with deep latch and nutritive suckling pattern.    Emilie discusses possibility of getting rental pump for discharge because she currently has the Anastasia pump. LC mentions purchasing another pump for home use versus a rental x 1 month. Emilie considering purchasing the Spectra S2 pump.    Recommend continuing to feed infant on demand. Supplement with 10-15ml EBM/formula prn and utilize double breastpump after each feeding session. Will continue to follow as needed.    Irlanda Bartholomew RN, IBCLC

## 2021-01-23 NOTE — LACTATION NOTE
This note was copied from a baby's chart.  LC into room to assist with breastfeeding. Emilie able to get positioned in bed and ready for breastfeeding in football hold on right breast. Using nipple shield. Wiped off breasts once Hydrogel removed. Tube and syringe with 12mls Similac at breast easily placed with nipple shield. Infant took the whole feeding without issues. Was content at the breast. Burped afterwards and was swaddled up by LC. Emilie set up to pump. Denies further questions or concerns. Thankful for LC support. Will continue to follow as needed.  NADIRA Villalobos RN, BSN, PHN, IBCLC

## 2021-01-24 VITALS
DIASTOLIC BLOOD PRESSURE: 69 MMHG | SYSTOLIC BLOOD PRESSURE: 109 MMHG | TEMPERATURE: 97.7 F | RESPIRATION RATE: 16 BRPM | OXYGEN SATURATION: 98 % | WEIGHT: 200 LBS | HEART RATE: 87 BPM | HEIGHT: 64 IN | BODY MASS INDEX: 34.15 KG/M2

## 2021-01-24 PROCEDURE — 250N000013 HC RX MED GY IP 250 OP 250 PS 637: Performed by: SPECIALIST

## 2021-01-24 PROCEDURE — 250N000013 HC RX MED GY IP 250 OP 250 PS 637: Performed by: STUDENT IN AN ORGANIZED HEALTH CARE EDUCATION/TRAINING PROGRAM

## 2021-01-24 RX ORDER — IBUPROFEN 600 MG/1
600 TABLET, FILM COATED ORAL EVERY 6 HOURS
Qty: 30 TABLET | Refills: 0 | Status: SHIPPED | OUTPATIENT
Start: 2021-01-24

## 2021-01-24 RX ORDER — OXYCODONE HYDROCHLORIDE 5 MG/1
5 TABLET ORAL EVERY 4 HOURS PRN
Qty: 15 TABLET | Refills: 0 | Status: SHIPPED | OUTPATIENT
Start: 2021-01-24

## 2021-01-24 RX ADMIN — OXYCODONE HYDROCHLORIDE 5 MG: 5 TABLET ORAL at 06:03

## 2021-01-24 RX ADMIN — IBUPROFEN 600 MG: 600 TABLET, FILM COATED ORAL at 10:15

## 2021-01-24 RX ADMIN — DOCUSATE SODIUM 50 MG AND SENNOSIDES 8.6 MG 2 TABLET: 8.6; 5 TABLET, FILM COATED ORAL at 07:57

## 2021-01-24 RX ADMIN — POLYETHYLENE GLYCOL 3350 17 G: 17 POWDER, FOR SOLUTION ORAL at 07:58

## 2021-01-24 RX ADMIN — ACETAMINOPHEN 975 MG: 325 TABLET, FILM COATED ORAL at 10:14

## 2021-01-24 RX ADMIN — IBUPROFEN 600 MG: 600 TABLET, FILM COATED ORAL at 03:27

## 2021-01-24 RX ADMIN — OXYCODONE HYDROCHLORIDE 5 MG: 5 TABLET ORAL at 01:57

## 2021-01-24 RX ADMIN — ACETAMINOPHEN 975 MG: 325 TABLET, FILM COATED ORAL at 03:27

## 2021-01-24 NOTE — DISCHARGE SUMMARY
[facility] Discharge Summary    Emilie Browning   Age: 40 year old MRN:[id number]  :1980         Date of Admission:  2021  Date of Discharge::  2021  Admitting Physician:   Anayeli Alonzo MD  Discharge Physician:  JOAO MEJAÍ DO     [facility] clinic: [facility]          Admission Diagnoses:   Spontaneous breech delivery, single or unspecified fetus [O32.1XX0]  H/O  section [Z98.891]          Discharge Diagnosis:   same          Procedures:   Procedure(s):  section       -           Medications Prior to Admission:     Medications Prior to Admission   Medication Sig Dispense Refill Last Dose     docusate sodium (COLACE) 100 MG capsule Take 100 mg by mouth 2 times daily   2021 at Unknown time     ferrous sulfate (FEROSUL) 325 (65 Fe) MG tablet Take 325 mg by mouth daily (with breakfast)   2021 at Unknown time     Prenatal Vit-Fe Fumarate-FA (PRENATAL MULTIVITAMIN W/IRON) 27-0.8 MG tablet Take 1 tablet by mouth daily   2021 at Unknown time     [DISCONTINUED] valACYclovir (VALTREX) 500 MG tablet Take 500 mg by mouth 2 times daily   2021 at Unknown time             Discharge Medications:     Current Discharge Medication List      START taking these medications    (continue tylenol as well)    Details   ibuprofen (ADVIL/MOTRIN) 600 MG tablet Take 1 tablet (600 mg) by mouth every 6 hours  Qty: 30 tablet, Refills: 0    Associated Diagnoses: H/O  section      oxyCODONE (ROXICODONE) 5 MG tablet Take 1 tablet (5 mg) by mouth every 4 hours as needed for pain  Qty: 15 tablet, Refills: 0    Associated Diagnoses: H/O  section         CONTINUE these medications which have NOT CHANGED    Details   docusate sodium (COLACE) 100 MG capsule Take 100 mg by mouth 2 times daily      ferrous sulfate (FEROSUL) 325 (65 Fe) MG tablet Take 325 mg by mouth daily (with breakfast)      Prenatal Vit-Fe Fumarate-FA (PRENATAL MULTIVITAMIN W/IRON) 27-0.8 MG tablet Take  1 tablet by mouth daily         STOP taking these medications       valACYclovir (VALTREX) 500 MG tablet Comments:   Reason for Stopping:                     Consultations:   No consultations were requested during this admission          Brief History of Labor:   Pt admitted for scheduled  for breech.           Hospital Course:   The patient's hospital course was unremarkable.  On discharge, her pain was well controlled. Vaginal bleeding is similar to peak menstrual flow.  Voiding without difficulty.  Ambulating well and tolerating a normal diet.  No fever.  Breastfeeding well.  Infant is stable.  Had BM this am.  She was discharged on post-partum day #3.    Post-partum hemoglobin:   Hemoglobin   Date Value Ref Range Status   2021 8.8 (L) 11.7 - 15.7 g/dL Final            Discharge Instructions and Follow-Up:   Discharge diet: Regular   Discharge activity: Nothing in Vagina for 6 weeks - nio intercourse, tampons.  Resume normal activities   If , no driving for 2 weeks. No heavy lifting for 6 weeks>15#   Discharge follow-up: RTC Dr Mustafa in 2&6 weeks for PP check           Discharge Disposition:   Discharged to home      Electronically signed by  Asmita Ayala DO  [phone]  8:57 AM  2021  [facility]

## 2021-01-24 NOTE — PLAN OF CARE
Vss, RA.  LS clear. BS present.  Passing flatus. UO adequate.  Incision intact with steri strips.  Tylenol, oxycodone, and ibuprofen controlling pain.  Breastfeeding with shield and using SNS.  Will discharge today.

## 2021-01-24 NOTE — PLAN OF CARE
Doing well,vss,voiding with out difficulty,pain control with tylenol,ibuprofen&oxycodone,incision intact with steri strips,baby breast feeding with nipple shield&supplementing formula using SNS.Plan to discharge today&follow up in clinic in 2 weeks or sooner with any concerns.

## 2021-01-24 NOTE — DISCHARGE INSTRUCTIONS
For pain med - try to stagger ibuprogen and tylenol - take 600-800mg ibuprofen at 10 and 4, then 1000mg tylenol at 1 and 7. Then you should take the oxycodone when needed for severe pain. Try to wean off of the oxycodone  in the next few days.     Postop  Birth Instructions    Activity       Do not lift more than 10 pounds for 6 weeks after surgery.  Ask family and friends for help when you need it.    No driving until you have stopped taking your pain medications (usually two weeks after surgery).    No heavy exercise or activity for 6 weeks.  Don't do anything that will put a strain on your surgery site.    Don't strain when using the toilet.  Your care team may prescribe a stool softener if you have problems with your bowel movements.     To care for your incision:       Keep the incision clean and dry.    Do not soak your incision in water. No swimming or hot tubs until it has fully healed. You may soak in the bathtub if the water level is below your incision.    Do not use peroxide, gel, cream, lotion, or ointment on your incision.    Adjust your clothes to avoid pressure on your surgery site (check the elastic in your underwear for example).     You may see a small amount of clear or pink drainage and this is normal.  Check with your health care provider:       If the drainage increases or has an odor.    If the incision reddens, you have swelling, or develop a rash.    If you have increased pain and the medicine we prescribed doesn't help.    If you have a fever above 100.4 F (38 C) with or without chills when placing thermometer under your tongue.   The area around your incision (surgery wound), will feel numb.  This is normal. The numbness should go away in less than a year.     Keep your hands clean:  Always wash your hands before touching your incision (surgery wound). This helps reduce your risk of infection. If your hands aren't dirty, you may use an alcohol hand-rub to clean your hands. Keep  your nails clean and short.    Call your healthcare provider if you have any of these symptoms:       You soak a sanitary pad with blood within 1 hour, or you see blood clots larger than a golf ball.    Bleeding that lasts more than 6 weeks.    Vaginal discharge that smells bad.    Severe pain, cramping or tenderness in your lower belly area.    A need to urinate more frequently (use the toilet more often), more urgently (use the toilet very quickly), or it burns when you urinate.    Nausea and vomiting.    Redness, swelling or pain around a vein in your leg.    Problems breastfeeding or a red or painful area on your breast.    Chest pain and cough or are gasping for air.    Problems with coping with sadness, anxiety or depression. If you have concerns about hurting yourself or the baby, call your provider immediately.      You have questions or concerns after you return home.

## 2021-01-24 NOTE — LACTATION NOTE
Routine check-in with Emilie prior to discharge. She states that breastfeeding is going well and that she is feeling more confident with positioning and feeding infant independently. Using a nipple shield d/t flatter nipples and supplementing formula at breast using SNS to keep infant engaged and interested in feeding. Emilie has not been pumping through the night; encourage utilizing pump as long as they are supplementing; if she starts to collect EBM, can transition off of formula. Also discuss that there is not a medical need for supplementation at this time, so they do not need to continue increasing supplementation volume. Would encourage staying with current volume so that infant would like to spend more time at the breast.    Review how to deal with engorgement. Emilie planning to purchase the Spectra pump, but until that arrives, should continue to use Anastasia. Recommend using outpatient lactation if need support in weaning off of nipple shield.    Appreciative of visit.    Irlanda Bartholomew, RN, IBCLC

## 2021-01-24 NOTE — PROGRESS NOTES
"Postpartum Day 3:     S:The patient feels well. \"doing great!\" Pain is well controlled with current medications. She is  ambulating,  voiding without difficulty.  Tolerating a regular diet without nausea or vomitting. She is  passing flatus and has had a bowel movement. The baby is well and she is workign on breast feeding. Also supplementing. Bleeding is  moderate. The patient has no emotional concerns. Denies headache, chest pain, shortness of breath.    O:  /69   Pulse 87   Temp 97.7  F (36.5  C) (Oral)   Resp 16   Ht 1.613 m (5' 3.5\")   Wt 90.7 kg (200 lb)   SpO2 98%   Breastfeeding Unknown   BMI 34.87 kg/m     Genl: AAOx3m NAD  Abd: Soft, NT,  Fundus firm  Incision c/d/i. steris in place.  Ext: nt/ tr edema    Recent Labs   Lab Test 21  0825 21  0539   HGB 8.8* 11.1*       Impression: 40 year old   [id number] POD#3 s/p primary  Section.  Normal postpartum course.   Blood loss anemia    Plan: Continue current care. Discharge home. Follow up in clinic in 2& 6 weeks.  reviewed pain med plan - wean oxy - stagger tylenol and ibupfren  take iron for 1 mo    Electronically signed by  Asmita Ayala DO  [phone]  8:36 AM  2021  [facility]     "

## 2021-08-15 ENCOUNTER — HEALTH MAINTENANCE LETTER (OUTPATIENT)
Age: 41
End: 2021-08-15

## 2021-10-11 ENCOUNTER — HEALTH MAINTENANCE LETTER (OUTPATIENT)
Age: 41
End: 2021-10-11

## 2022-09-24 ENCOUNTER — HEALTH MAINTENANCE LETTER (OUTPATIENT)
Age: 42
End: 2022-09-24

## 2023-10-14 ENCOUNTER — HEALTH MAINTENANCE LETTER (OUTPATIENT)
Age: 43
End: 2023-10-14

## 2024-03-02 ENCOUNTER — HEALTH MAINTENANCE LETTER (OUTPATIENT)
Age: 44
End: 2024-03-02

## (undated) DEVICE — PACK C-SECTION LF PL15OTA83B

## (undated) DEVICE — PREP CHLORAPREP 26ML TINTED ORANGE  260815

## (undated) DEVICE — SOL NACL 0.9% IRRIG 1000ML BOTTLE 07138-09

## (undated) DEVICE — LINEN TOWEL PACK X5 5464

## (undated) DEVICE — LIGHT HANDLE X2

## (undated) DEVICE — LINEN BABY BLANKET 5434

## (undated) DEVICE — PACK SET-UP STD 9102

## (undated) DEVICE — SUCTION CANISTER MEDIVAC LINER 3000ML W/LID 65651-530

## (undated) DEVICE — DRSG KERLIX FLUFFS X5

## (undated) DEVICE — STPL SKIN PROXIMATE 35 WIDE PMW35

## (undated) DEVICE — BLADE CLIPPER 4406

## (undated) DEVICE — SOL WATER IRRIG 1000ML BOTTLE 07139-09

## (undated) DEVICE — PAD CHUX UNDERPAD 23X24" 7136

## (undated) DEVICE — ESU GROUND PAD UNIVERSAL W/O CORD

## (undated) DEVICE — DRAPE SHEET REV FOLD 3/4 9349

## (undated) RX ORDER — DEXAMETHASONE SODIUM PHOSPHATE 4 MG/ML
INJECTION, SOLUTION INTRA-ARTICULAR; INTRALESIONAL; INTRAMUSCULAR; INTRAVENOUS; SOFT TISSUE
Status: DISPENSED
Start: 2021-01-21

## (undated) RX ORDER — ONDANSETRON 2 MG/ML
INJECTION INTRAMUSCULAR; INTRAVENOUS
Status: DISPENSED
Start: 2021-01-21

## (undated) RX ORDER — OXYTOCIN/0.9 % SODIUM CHLORIDE 30/500 ML
PLASTIC BAG, INJECTION (ML) INTRAVENOUS
Status: DISPENSED
Start: 2021-01-21

## (undated) RX ORDER — PROPOFOL 10 MG/ML
INJECTION, EMULSION INTRAVENOUS
Status: DISPENSED
Start: 2021-01-21

## (undated) RX ORDER — MORPHINE SULFATE 1 MG/ML
INJECTION, SOLUTION EPIDURAL; INTRATHECAL; INTRAVENOUS
Status: DISPENSED
Start: 2021-01-21

## (undated) RX ORDER — LIDOCAINE HYDROCHLORIDE 20 MG/ML
INJECTION, SOLUTION EPIDURAL; INFILTRATION; INTRACAUDAL; PERINEURAL
Status: DISPENSED
Start: 2021-01-21

## (undated) RX ORDER — FENTANYL CITRATE 50 UG/ML
INJECTION, SOLUTION INTRAMUSCULAR; INTRAVENOUS
Status: DISPENSED
Start: 2021-01-21